# Patient Record
Sex: MALE | Race: WHITE | NOT HISPANIC OR LATINO | Employment: OTHER | URBAN - METROPOLITAN AREA
[De-identification: names, ages, dates, MRNs, and addresses within clinical notes are randomized per-mention and may not be internally consistent; named-entity substitution may affect disease eponyms.]

---

## 2024-03-04 DIAGNOSIS — M79.642 PAIN IN LEFT HAND: Primary | ICD-10-CM

## 2024-04-01 ENCOUNTER — HOSPITAL ENCOUNTER (OUTPATIENT)
Dept: RADIOLOGY | Facility: CLINIC | Age: 84
Discharge: HOME | End: 2024-04-01
Attending: STUDENT IN AN ORGANIZED HEALTH CARE EDUCATION/TRAINING PROGRAM
Payer: OTHER GOVERNMENT

## 2024-04-01 ENCOUNTER — OFFICE VISIT (OUTPATIENT)
Dept: ORTHOPEDICS | Facility: CLINIC | Age: 84
End: 2024-04-01
Payer: OTHER GOVERNMENT

## 2024-04-01 VITALS
SYSTOLIC BLOOD PRESSURE: 146 MMHG | DIASTOLIC BLOOD PRESSURE: 79 MMHG | WEIGHT: 172 LBS | HEART RATE: 66 BPM | BODY MASS INDEX: 24.08 KG/M2 | HEIGHT: 71 IN

## 2024-04-01 DIAGNOSIS — M18.12 ARTHRITIS OF CARPOMETACARPAL (CMC) JOINT OF LEFT THUMB: Primary | ICD-10-CM

## 2024-04-01 DIAGNOSIS — M79.641 BILATERAL HAND PAIN: ICD-10-CM

## 2024-04-01 DIAGNOSIS — M79.642 BILATERAL HAND PAIN: ICD-10-CM

## 2024-04-01 DIAGNOSIS — M79.642 PAIN IN LEFT HAND: ICD-10-CM

## 2024-04-01 PROCEDURE — 73130 X-RAY EXAM OF HAND: CPT | Mod: LT,,, | Performed by: STUDENT IN AN ORGANIZED HEALTH CARE EDUCATION/TRAINING PROGRAM

## 2024-04-01 PROCEDURE — 99203 OFFICE O/P NEW LOW 30 MIN: CPT | Mod: ,,, | Performed by: STUDENT IN AN ORGANIZED HEALTH CARE EDUCATION/TRAINING PROGRAM

## 2024-04-01 PROCEDURE — 73130 X-RAY EXAM OF HAND: CPT | Mod: RT,,, | Performed by: STUDENT IN AN ORGANIZED HEALTH CARE EDUCATION/TRAINING PROGRAM

## 2024-04-01 RX ORDER — AMLODIPINE BESYLATE 100 %
POWDER (GRAM) MISCELLANEOUS
COMMUNITY
End: 2024-04-24

## 2024-04-02 NOTE — PROGRESS NOTES
"Chief Complaint:  Left thumb pain    Consulting Physician: Order, Paper    History of present illness:    Patient is a 83-year-old right-hand dominant male who is retired presents for initial evaluation of his left worse than right bilateral thumb pain.  He localizes the pain to the thumb CMC joint.  This is worse when doing activities such as lifting.  He complains of a sharp pain in his area.  He has not worn a brace.  He has not had an injection.  He has not had any surgery for this.  He exacerbated this when he was doing yd work    History reviewed. No pertinent past medical history.    History reviewed. No pertinent surgical history.    Current Outpatient Medications   Medication Sig    amLODIPine besylate, bulk, 100 % Powd amLODIPine Besylate    GEMFIBROZIL, BULK, MISC      No current facility-administered medications for this visit.       Review of patient's allergies indicates:  No Known Allergies    History reviewed. No pertinent family history.    Social History     Socioeconomic History    Marital status:    Tobacco Use    Smoking status: Never    Smokeless tobacco: Never       Review of Systems:    Constitution:   Denies chills, fever, and sweats.  HENT:   Denies headaches or blurry vision.  Cardiovascular:  Denies chest pain or irregular heart beat.  Respiratory:   Denies cough or shortness of breath.  Gastrointestinal:  Denies abdominal pain, nausea, or vomiting.  Musculoskeletal:   Denies muscle cramps.  Neurological:   Denies dizziness or focal weakness.  Psychiatric/Behavior: Normal mental status.  Hematology/Lymph:  Denies bleeding problem or easy bruising/bleeding.  Skin:    Denies rash or suspicious lesions.    Examination:    Vital Signs:    Vitals:    04/01/24 0855   BP: (!) 146/79   Pulse: 66   Weight: 78 kg (172 lb)   Height: 5' 11" (1.803 m)   PainSc:   8       Body mass index is 23.99 kg/m².    Constitution:   Well-developed, well nourished patient in no acute " distress.  Neurological:   Alert and oriented x 3 and cooperative to examination.     Psychiatric/Behavior: Normal mental status.  Respiratory:   No shortness of breath.  Eyes:    Extraoccular muscles intact  Skin:    No scars, rash or suspicious lesions.    MSK:   Right hand:  No open wounds or rashes.  Tenderness to palpation of the thumb CMC joint.  Grind test is positive.  Full range of motion of the wrist hand and digits.  Sensation light touch intact in median ulnar radial distribution radial pulse 2 +hand is warm well perfused    Left hand:  No open wounds or rashes.  Tenderness to palpation of the thumb CMC joint.  Grind test is positive.  Full range of motion of the wrist hand and digits.  Sensation light touch intact in median ulnar radial distribution radial pulse 2 +hand is warm well perfused    Imaging:   X-ray of the right hand three views shows thumb CMC osteoarthritis  X-ray of the left hand three views shows thumb CMC osteoarthritis     Assessment:  Bilateral thumb CMC osteoarthritis    Plan:  We went over different treatment options including conservative treatment with bracing, injections and operative treatment such as CMC arthroplasty.  He would like to start with a brace.  He would like to start with the brace with the left side since that is the most symptomatic side.  I will give him a thumb CMC brace today and he can follow up with me as needed if his pain persists we can try a CMC injection.    Follow Up:  As needed  Xray at next visit:  None

## 2024-04-24 ENCOUNTER — OFFICE VISIT (OUTPATIENT)
Dept: ORTHOPEDICS | Facility: CLINIC | Age: 84
End: 2024-04-24
Payer: OTHER GOVERNMENT

## 2024-04-24 VITALS
DIASTOLIC BLOOD PRESSURE: 85 MMHG | BODY MASS INDEX: 24.07 KG/M2 | HEIGHT: 71 IN | TEMPERATURE: 98 F | WEIGHT: 171.94 LBS | HEART RATE: 64 BPM | SYSTOLIC BLOOD PRESSURE: 164 MMHG

## 2024-04-24 DIAGNOSIS — M18.12 ARTHRITIS OF CARPOMETACARPAL (CMC) JOINT OF LEFT THUMB: Primary | ICD-10-CM

## 2024-04-24 PROCEDURE — 99213 OFFICE O/P EST LOW 20 MIN: CPT | Mod: 25,,, | Performed by: STUDENT IN AN ORGANIZED HEALTH CARE EDUCATION/TRAINING PROGRAM

## 2024-04-24 PROCEDURE — 20600 DRAIN/INJ JOINT/BURSA W/O US: CPT | Mod: LT,,, | Performed by: STUDENT IN AN ORGANIZED HEALTH CARE EDUCATION/TRAINING PROGRAM

## 2024-04-24 RX ORDER — BETAMETHASONE SODIUM PHOSPHATE AND BETAMETHASONE ACETATE 3; 3 MG/ML; MG/ML
6 INJECTION, SUSPENSION INTRA-ARTICULAR; INTRALESIONAL; INTRAMUSCULAR; SOFT TISSUE
Status: DISCONTINUED | OUTPATIENT
Start: 2024-04-24 | End: 2024-04-24 | Stop reason: HOSPADM

## 2024-04-24 RX ORDER — LIDOCAINE HYDROCHLORIDE 10 MG/ML
1 INJECTION INFILTRATION; PERINEURAL
Status: DISCONTINUED | OUTPATIENT
Start: 2024-04-24 | End: 2024-04-24 | Stop reason: HOSPADM

## 2024-04-24 RX ORDER — AMLODIPINE BESYLATE 5 MG/1
5 TABLET ORAL
COMMUNITY
Start: 2024-02-22

## 2024-04-24 RX ORDER — GEMFIBROZIL 600 MG/1
600 TABLET, FILM COATED ORAL
COMMUNITY
Start: 2024-02-22

## 2024-04-24 RX ORDER — PANTOPRAZOLE SODIUM 20 MG/1
20 TABLET, DELAYED RELEASE ORAL DAILY
COMMUNITY
Start: 2024-02-22

## 2024-04-24 RX ADMIN — BETAMETHASONE SODIUM PHOSPHATE AND BETAMETHASONE ACETATE 6 MG: 3; 3 INJECTION, SUSPENSION INTRA-ARTICULAR; INTRALESIONAL; INTRAMUSCULAR; SOFT TISSUE at 08:04

## 2024-04-24 RX ADMIN — LIDOCAINE HYDROCHLORIDE 1 ML: 10 INJECTION INFILTRATION; PERINEURAL at 08:04

## 2024-04-24 NOTE — PROCEDURES
Small Joint Aspiration/Injection: L thumb CMC    Date/Time: 4/24/2024 8:30 AM    Performed by: Jerzy Alvarado MD  Authorized by: Jerzy Alvarado MD    Consent Done?:  Yes (Verbal)  Indications:  Pain  Timeout: prior to procedure the correct patient, procedure, and site was verified    Location:  Thumb  Site:  L thumb CMC  Needle size:  25 G  Approach:  Dorsal  Medications:  1 mL LIDOcaine HCL 10 mg/ml (1%) 10 mg/mL (1 %); 6 mg betamethasone acetate-betamethasone sodium phosphate 6 mg/mL  Patient tolerance:  Patient tolerated the procedure well with no immediate complications

## 2024-04-24 NOTE — PROGRESS NOTES
Chief Complaint:  Left thumb pain    Consulting Physician: Order, Paper    History of present illness:    Patient is a 83-year-old right-hand dominant male who is retired presents for follow up evaluation of his left worse than right bilateral thumb pain.  He localizes the pain to the thumb CMC joint.  This is worse when doing activities such as lifting.  He complains of a sharp pain in his area.  He has not worn a brace.  He has not had an injection.  He has not had any surgery for this.  He exacerbated this when he was doing yd work    I saw in my clinic last time where I diagnosed him with a CMC arthritis.  He is here for an injection today    No past medical history on file.    No past surgical history on file.    Current Outpatient Medications   Medication Sig Dispense Refill    amLODIPine (NORVASC) 5 MG tablet 5 mg.      gemfibroziL (LOPID) 600 MG tablet 600 mg.      pantoprazole (PROTONIX) 20 MG tablet Take 20 mg by mouth once daily.       No current facility-administered medications for this visit.       Review of patient's allergies indicates:  No Known Allergies    No family history on file.    Social History     Socioeconomic History    Marital status:    Tobacco Use    Smoking status: Never    Smokeless tobacco: Never       Review of Systems:    Constitution:   Denies chills, fever, and sweats.  HENT:   Denies headaches or blurry vision.  Cardiovascular:  Denies chest pain or irregular heart beat.  Respiratory:   Denies cough or shortness of breath.  Gastrointestinal:  Denies abdominal pain, nausea, or vomiting.  Musculoskeletal:   Denies muscle cramps.  Neurological:   Denies dizziness or focal weakness.  Psychiatric/Behavior: Normal mental status.  Hematology/Lymph:  Denies bleeding problem or easy bruising/bleeding.  Skin:    Denies rash or suspicious lesions.    Examination:    Vital Signs:    Vitals:    04/24/24 0845 04/24/24 0853   BP: (!) 167/87 (!) 164/85   Pulse: 60 64   Temp: 98 °F (36.7  "°C)    Weight: 78 kg (171 lb 15.3 oz)    Height: 5' 11" (1.803 m)        Body mass index is 23.98 kg/m².    Constitution:   Well-developed, well nourished patient in no acute distress.  Neurological:   Alert and oriented x 3 and cooperative to examination.     Psychiatric/Behavior: Normal mental status.  Respiratory:   No shortness of breath.  Eyes:    Extraoccular muscles intact  Skin:    No scars, rash or suspicious lesions.    MSK:   Right hand:  No open wounds or rashes.  Tenderness to palpation of the thumb CMC joint.  Grind test is positive.  Full range of motion of the wrist hand and digits.  Sensation light touch intact in median ulnar radial distribution radial pulse 2 +hand is warm well perfused    Left hand:  No open wounds or rashes.  Tenderness to palpation of the thumb CMC joint.  Grind test is positive.  Full range of motion of the wrist hand and digits.  Sensation light touch intact in median ulnar radial distribution radial pulse 2 +hand is warm well perfused    Imaging:   X-ray of the right hand three views shows thumb CMC osteoarthritis  X-ray of the left hand three views shows thumb CMC osteoarthritis     Assessment:  Bilateral thumb CMC osteoarthritis, left worse than right    Plan:  He had like an injection into the left thumb CMC joint.  I will give him an injection today.  He can continue wearing his brace.  He can follow up with me as needed    Follow Up:  As needed  Xray at next visit:  None        "

## 2024-05-01 DIAGNOSIS — N28.1 KIDNEY CYSTS: Primary | ICD-10-CM

## 2024-05-09 ENCOUNTER — OFFICE VISIT (OUTPATIENT)
Dept: UROLOGY | Facility: CLINIC | Age: 84
End: 2024-05-09
Payer: OTHER GOVERNMENT

## 2024-05-09 VITALS
OXYGEN SATURATION: 97 % | BODY MASS INDEX: 22.49 KG/M2 | HEIGHT: 71 IN | HEART RATE: 66 BPM | RESPIRATION RATE: 20 BRPM | WEIGHT: 160.63 LBS | SYSTOLIC BLOOD PRESSURE: 148 MMHG | DIASTOLIC BLOOD PRESSURE: 83 MMHG | TEMPERATURE: 98 F

## 2024-05-09 DIAGNOSIS — N28.1 RENAL CYST: ICD-10-CM

## 2024-05-09 LAB
BILIRUB SERPL-MCNC: NORMAL MG/DL
BLOOD URINE, POC: NORMAL
COLOR, POC UA: YELLOW
GLUCOSE UR QL STRIP: NORMAL
KETONES UR QL STRIP: NORMAL
LEUKOCYTE ESTERASE URINE, POC: NORMAL
NITRITE, POC UA: NORMAL
PH, POC UA: 5.5
PROTEIN, POC: NORMAL
SPECIFIC GRAVITY, POC UA: >=1.03
UROBILINOGEN, POC UA: 0.2

## 2024-05-09 PROCEDURE — 99214 OFFICE O/P EST MOD 30 MIN: CPT | Mod: PBBFAC | Performed by: UROLOGY

## 2024-05-09 PROCEDURE — 99204 OFFICE O/P NEW MOD 45 MIN: CPT | Mod: S$PBB,,, | Performed by: UROLOGY

## 2024-05-09 PROCEDURE — 81001 URINALYSIS AUTO W/SCOPE: CPT | Mod: PBBFAC | Performed by: UROLOGY

## 2024-05-09 NOTE — PROGRESS NOTES
Placed in room. Seen by  Dr. Colindres.  Spoke with patient. Obtain CT of the abdomen and pelvis. RTC in a month.

## 2024-05-09 NOTE — PROGRESS NOTES
CC:  Renal cysts    HPI:  Justin Castellano is a 84 y.o. male being seen in consultation for renal cysts.  [facility]  This is the first knowledge he has had of having these.  Denies any other urologic history or symptoms.    Urinalysis:  Results for orders placed or performed in visit on 05/09/24   POCT URINE DIPSTICK WITH MICROSCOPE, AUTOMATED   Result Value Ref Range    Color, UA Yellow     Spec Grav UA >=1.030     pH, UA 5.5     WBC, UA neg     Nitrite, UA neg     Protein, POC neg     Glucose, UA neg     Ketones, UA neg     Urobilinogen, UA 0.2     Bilirubin, POC neg     Blood, UA neg      Microscopic Urinalysis:  WBC:   None per HPF     RBC:    None per HPF     Bacteria:    None per HPF     Squamous epithelial cells:    None per HPF       Crystals:   None    Lab Results:  [facility].    Imaging:  Renal ultrasound - 7 March 2024:    1.3 cm hypoechoic lesion in the mid right kidney  4.2 cm anechoic lesion in the upper pole of the left kidney  1.0 cm anechoic lesion in the lower pole of the left kidney    Data Review:  [facility], dated [facility] 2024.  Ultrasound.  Labs.    ROS:  All systems reviewed and are negative except as documented in HPI and/or Assessment and Plan.     Patient Active Problem List:     There is no problem list on file for this patient.       Past Medical History:  History reviewed. No pertinent past medical history.     Past Surgical History:  History reviewed. No pertinent surgical history.     Family History:  History reviewed. No pertinent family history.     Social History:  Social History     Socioeconomic History    Marital status:    Tobacco Use    Smoking status: Former     Current packs/day: 1.00     Types: Cigarettes     Passive exposure: Current    Smokeless tobacco: Never        Allergies:  Review of patient's allergies indicates:  No Known Allergies     Objective:  Vitals:    05/09/24  0745   BP: (!) 148/83   Pulse: 66   Resp: 20   Temp: 98.4 °F (36.9 °C)     General:  Well developed, well nourished adult male in no acute distress  Abdomen: Soft, nontender, no masses  Extremities:  No clubbing, cyanosis, or edema  Neurologic:  Grossly intact  Musculoskeletal:  Normal tone    Assessment:  1. Renal cyst  - Ambulatory referral/consult to Urology  - POCT URINE DIPSTICK WITH MICROSCOPE, AUTOMATED  - CT Abdomen Pelvis W Wo Contrast; Future  - Creatinine, serum; Future     Plan:  The left-sided lesions or clearly simple cysts based on their description as being anechoic.  The right-sided lesion is hypoechoic so this needs further evaluation with a CT scan with and without contrast.    Follow-up:  After CT.

## 2024-05-23 ENCOUNTER — HOSPITAL ENCOUNTER (OUTPATIENT)
Dept: RADIOLOGY | Facility: HOSPITAL | Age: 84
Discharge: HOME | End: 2024-05-23
Attending: UROLOGY
Payer: OTHER GOVERNMENT

## 2024-05-23 DIAGNOSIS — N28.1 RENAL CYST: ICD-10-CM

## 2024-05-23 LAB
CREAT SERPL-MCNC: 1.07 MG/DL (ref 0.73–1.18)
GFR SERPLBLD CREATININE-BSD FMLA CKD-EPI: >60 ML/MIN/1.73/M2

## 2024-05-23 PROCEDURE — 82565 ASSAY OF CREATININE: CPT | Performed by: UROLOGY

## 2024-05-23 PROCEDURE — 25500020 PHARM REV CODE 255: Performed by: UROLOGY

## 2024-05-23 PROCEDURE — 74178 CT ABD&PLV WO CNTR FLWD CNTR: CPT | Mod: TC

## 2024-05-23 RX ADMIN — IOHEXOL 100 ML: 350 INJECTION, SOLUTION INTRAVENOUS at 12:05

## 2024-06-20 ENCOUNTER — OFFICE VISIT (OUTPATIENT)
Dept: UROLOGY | Facility: CLINIC | Age: 84
End: 2024-06-20
Payer: OTHER GOVERNMENT

## 2024-06-20 VITALS
RESPIRATION RATE: 18 BRPM | HEIGHT: 71 IN | OXYGEN SATURATION: 98 % | DIASTOLIC BLOOD PRESSURE: 85 MMHG | HEART RATE: 67 BPM | SYSTOLIC BLOOD PRESSURE: 132 MMHG | WEIGHT: 162 LBS | BODY MASS INDEX: 22.68 KG/M2

## 2024-06-20 DIAGNOSIS — N28.1 RENAL CYST: Primary | ICD-10-CM

## 2024-06-20 LAB
BILIRUB SERPL-MCNC: NEGATIVE MG/DL
BLOOD URINE, POC: NEGATIVE
COLOR, POC UA: YELLOW
GLUCOSE UR QL STRIP: NEGATIVE
KETONES UR QL STRIP: NEGATIVE
LEUKOCYTE ESTERASE URINE, POC: NEGATIVE
NITRITE, POC UA: NEGATIVE
PH, POC UA: 5.5
PROTEIN, POC: NEGATIVE
SPECIFIC GRAVITY, POC UA: 1.02
UROBILINOGEN, POC UA: 0.2

## 2024-06-20 PROCEDURE — 81001 URINALYSIS AUTO W/SCOPE: CPT | Mod: PBBFAC | Performed by: UROLOGY

## 2024-06-20 PROCEDURE — 99213 OFFICE O/P EST LOW 20 MIN: CPT | Mod: S$PBB,,, | Performed by: UROLOGY

## 2024-06-20 PROCEDURE — 99214 OFFICE O/P EST MOD 30 MIN: CPT | Mod: PBBFAC | Performed by: UROLOGY

## 2024-06-20 NOTE — PROGRESS NOTES
CC:  Imaging results    HPI:  Justin Castellano is a 84 y.o. male seen for follow-up of imaging.  He was found to bilateral renal cysts on [facility]. one of the cysts was described as possibly something other than a simple cyst.  I obtained a CT scan.  He has no urinary complaints.    Urinalysis:  Results for orders placed or performed in visit on 06/20/24   POCT URINE DIPSTICK WITH MICROSCOPE, AUTOMATED   Result Value Ref Range    Color, UA Yellow     Spec Grav UA 1.025     pH, UA 5.5     WBC, UA Negative     Nitrite, UA Negative     Protein, POC Negative     Glucose, UA Negative     Ketones, UA Negative     Urobilinogen, UA 0.2     Bilirubin, POC Negative     Blood, UA Negative      Microscopic Urinalysis:  WBC:   None per HPF     RBC:    None per HPF     Bacteria:    None per HPF     Squamous epithelial cells:    None per HPF      Crystals:   None    Lab Results:    Recent Labs     05/23/24  1127   CREATININE 1.07        Imaging:  CT - 23 May 2024:  There is a simple cyst in the upper pole of the left kidney  There has a simple cyst in the mid right kidney  I viewed the images myself to evaluate for the 3rd cyst which was not mentioned in the CT report.  This is seen in the lower pole of the left kidney appears to be a simple cyst as well.     ROS:  All systems reviewed and are negative except as documented in HPI and/or Assessment and Plan.     Patient Active Problem List:     There is no problem list on file for this patient.       Past Medical History:  History reviewed. No pertinent past medical history.     Past Surgical History:  History reviewed. No pertinent surgical history.     Family History:  History reviewed. No pertinent family history.     Social History:  Social History     Socioeconomic History    Marital status:    Tobacco Use    Smoking status: Former     Current packs/day: 1.00     Types: Cigarettes     Passive exposure: Current    Smokeless tobacco: Never         Allergies:  Review of patient's allergies indicates:  No Known Allergies     Objective:  Vitals:    06/20/24 0853   BP: 132/85   Pulse: 67   Resp: 18     General:  Well developed, well nourished adult male in no acute distress  Abdomen: Soft, nontender, no masses  Extremities:  No clubbing, cyanosis, or edema  Neurologic:  Grossly intact  Musculoskeletal:  Normal tone    Assessment:  1. Renal cyst  - POCT URINE DIPSTICK WITH MICROSCOPE, AUTOMATED  - US Retroperitoneal Limited; Future     Plan:  The cysts appear to be cysts area we will repeat the ultrasound in six months.    Follow-up:  Renal ultrasound in six months and follow-up after.

## 2024-06-20 NOTE — PROGRESS NOTES
Patient seen by Dr. Aguayo. Pt will RTC 6 months with US. Pt education given both written and verbal.

## 2024-10-14 ENCOUNTER — TELEPHONE (OUTPATIENT)
Dept: UROLOGY | Facility: CLINIC | Age: 84
End: 2024-10-14
Payer: OTHER GOVERNMENT

## 2024-10-14 NOTE — TELEPHONE ENCOUNTER
New [facility] authorization requested    ----- Message from Cristina sent at 2024  9:13 AM CDT -----  Regarding: Patient Called  Angelica,    Mr. Castellano called and stated that he believes his [facility] authorization will  on 24. His next scheduled appointment is 24. Can you please make sure that he gets a new authorization before October. He said he will be leaving the country for the month of October, and he wants to make sure that he has a new authorization before he departs, otherwise he will have to cancel his appointment... in December.     Thanks,  Cristina

## 2024-10-24 ENCOUNTER — PATIENT MESSAGE (OUTPATIENT)
Dept: RESEARCH | Facility: HOSPITAL | Age: 84
End: 2024-10-24
Payer: OTHER GOVERNMENT

## 2024-11-19 ENCOUNTER — HOSPITAL ENCOUNTER (OUTPATIENT)
Dept: RADIOLOGY | Facility: HOSPITAL | Age: 84
Discharge: HOME | End: 2024-11-19
Attending: UROLOGY
Payer: OTHER GOVERNMENT

## 2024-11-19 DIAGNOSIS — N28.1 RENAL CYST: ICD-10-CM

## 2024-11-19 PROCEDURE — 76775 US EXAM ABDO BACK WALL LIM: CPT | Mod: TC

## 2024-12-20 ENCOUNTER — OFFICE VISIT (OUTPATIENT)
Dept: UROLOGY | Facility: CLINIC | Age: 84
End: 2024-12-20
Payer: OTHER GOVERNMENT

## 2024-12-20 VITALS
WEIGHT: 161 LBS | DIASTOLIC BLOOD PRESSURE: 68 MMHG | OXYGEN SATURATION: 99 % | SYSTOLIC BLOOD PRESSURE: 136 MMHG | BODY MASS INDEX: 23.05 KG/M2 | HEIGHT: 70 IN | TEMPERATURE: 98 F | HEART RATE: 58 BPM

## 2024-12-20 DIAGNOSIS — N28.1 RENAL CYST: Primary | ICD-10-CM

## 2024-12-20 LAB
BILIRUB SERPL-MCNC: NORMAL MG/DL
BLOOD URINE, POC: NORMAL
COLOR, POC UA: YELLOW
GLUCOSE UR QL STRIP: NORMAL
KETONES UR QL STRIP: NORMAL
LEUKOCYTE ESTERASE URINE, POC: NORMAL
NITRITE, POC UA: NORMAL
PH, POC UA: 5.5
PROTEIN, POC: NORMAL
SPECIFIC GRAVITY, POC UA: 1.02
UROBILINOGEN, POC UA: 0.2

## 2024-12-20 PROCEDURE — 99214 OFFICE O/P EST MOD 30 MIN: CPT | Mod: PBBFAC | Performed by: UROLOGY

## 2024-12-20 NOTE — PROGRESS NOTES
CC:  Imaging results    HPI:  Justin Castellano is a 84 y.o. male seen for follow-up of imaging results.  He was found to bilateral renal cysts on [facility]. one of the cysts was described as possibly something other than a simple cyst. I obtained a CT scan. He has no urinary complaints.     Urinalysis:  Results for orders placed or performed in visit on 12/20/24   POCT URINE DIPSTICK WITH MICROSCOPE, AUTOMATED   Result Value Ref Range    Color, UA Yellow     Spec Grav UA 1.020     pH, UA 5.5     WBC, UA neg     Nitrite, UA neg     Protein, POC neg     Glucose, UA neg     Ketones, UA neg     Urobilinogen, UA 0.2     Bilirubin, POC neg     Blood, UA neg      Microscopic Urinalysis:  WBC:   None per HPF     RBC:    None per HPF     Bacteria:    None per HPF     Squamous epithelial cells:  None per HPF      Crystals:   None    Imaging:  Retroperitoneal ultrasound - 19 November 2024:  The cyst in the right kidney is not visible on this ultrasound imaging.  There is a cyst that measures 3.8 x 4.2 x 3.4 on the left.    The studies and a left kidney in the lower pole is not visualized on this imaging study.  Via every one of the hospitals cards Mac is lying delta this time  ROS:  All systems reviewed and are negative except as documented in HPI and/or Assessment and Plan.     Patient Active Problem List:     There is no problem list on file for this patient.       Past Medical History:  History reviewed. No pertinent past medical history.     Past Surgical History:  Past Surgical History:   Procedure Laterality Date    CHOLECYSTECTOMY      TRIGGER FINGER RELEASE Left         Family History:  History reviewed. No pertinent family history.     Social History:  Social History     Socioeconomic History    Marital status:    Tobacco Use    Smoking status: Former     Current packs/day: 1.00     Types: Cigarettes     Passive exposure: Current    Smokeless tobacco: Never   Substance and Sexual Activity     Alcohol use: Not Currently     Comment: occasional    Drug use: Not Currently        Allergies:  Review of patient's allergies indicates:  No Known Allergies     Objective:  Vitals:    12/20/24 0858   BP: 136/68   Pulse: (!) 58   Temp: 97.8 °F (36.6 °C)     General:  Well developed, well nourished adult male in no acute distress  Abdomen: Soft, nontender, no masses  Extremities:  No clubbing, cyanosis, or edema  Neurologic:  Grossly intact  Musculoskeletal:  Normal tone    Assessment:  This is gone on for quite a while    Plan:  The renal cysts are stable.  Two of the cysts are not visible.  Continue observation.    Follow-up tests needed:   Retroperitoneal ultrasound in six months.      Return appointment:  Six months with above study.

## 2024-12-20 NOTE — PROGRESS NOTES
Pt seen by Dr. Aguayo; Pt instructed to return to clinic in 6 months w/renal US; Discharge paperwork given w/pt verbalizing understanding

## 2025-07-11 ENCOUNTER — HOSPITAL ENCOUNTER (OUTPATIENT)
Dept: RADIOLOGY | Facility: HOSPITAL | Age: 85
Discharge: HOME | End: 2025-07-11
Attending: UROLOGY
Payer: MEDICARE

## 2025-07-11 DIAGNOSIS — N28.1 RENAL CYST: ICD-10-CM

## 2025-07-11 PROCEDURE — 76775 US EXAM ABDO BACK WALL LIM: CPT | Mod: TC

## 2025-07-24 ENCOUNTER — OFFICE VISIT (OUTPATIENT)
Dept: UROLOGY | Facility: CLINIC | Age: 85
End: 2025-07-24
Payer: MEDICARE

## 2025-07-24 VITALS
HEIGHT: 70 IN | SYSTOLIC BLOOD PRESSURE: 125 MMHG | WEIGHT: 159.19 LBS | DIASTOLIC BLOOD PRESSURE: 67 MMHG | OXYGEN SATURATION: 97 % | TEMPERATURE: 99 F | RESPIRATION RATE: 20 BRPM | HEART RATE: 59 BPM | BODY MASS INDEX: 22.79 KG/M2

## 2025-07-24 DIAGNOSIS — N28.1 RENAL CYST: Primary | ICD-10-CM

## 2025-07-24 PROCEDURE — 81001 URINALYSIS AUTO W/SCOPE: CPT | Mod: PBBFAC | Performed by: UROLOGY

## 2025-07-24 PROCEDURE — 99215 OFFICE O/P EST HI 40 MIN: CPT | Mod: PBBFAC | Performed by: UROLOGY

## 2025-07-24 PROCEDURE — 99213 OFFICE O/P EST LOW 20 MIN: CPT | Mod: S$PBB,,, | Performed by: UROLOGY

## 2025-07-24 RX ORDER — CLOPIDOGREL BISULFATE 75 MG/1
75 TABLET ORAL DAILY
COMMUNITY
Start: 2025-05-10

## 2025-07-24 RX ORDER — ASPIRIN 81 MG/1
81 TABLET ORAL DAILY
COMMUNITY
Start: 2025-05-10

## 2025-07-24 NOTE — PROGRESS NOTES
CC:  Ultrasound results    HPI:  Justin Castellano is a 85 y.o. male seen for follow-up of ultrasound.  He was found to bilateral renal cysts [facility]. One of the cysts was described as possibly something other than a simple cyst. I obtained a CT scan which showed a 0.7 x 0.7 cm cyst in the mid right kidney and a 3.8 x 4.2 x 3.4 cm cyst in the upper pole of the left kidney.  He is asymptomatic.    Urinalysis:  Results for orders placed or performed in visit on 07/24/25   POCT URINE DIPSTICK WITH MICROSCOPE, AUTOMATED   Result Value Ref Range    Color, UA Yellow     Spec Grav UA 1.020     pH, UA 5.5     WBC, UA Negative     Nitrite, UA Negative     Protein, POC Negative     Glucose, UA Negative     Ketones, UA Negative     Urobilinogen, UA 0.2     Bilirubin, POC Negative     Blood, UA Negative      Microscopic Urinalysis:  WBC:   None per HPF     RBC:    None per HPF     Bacteria:    None per HPF     Squamous epithelial cells:  None per HPF      Crystals:   None    Imaging:  Retroperitoneal ultrasound - 11 July 2025:  Right kidney is normal.  Left kidney has a cyst in the upper pole measuring 4.4 x 4.0 x 4.7 cm.    ROS:  All systems reviewed and are negative except as documented in HPI and/or Assessment and Plan.     Patient Active Problem List:     Problem List[1]     Past Medical History:  Past Medical History:   Diagnosis Date    Essential (primary) hypertension         Past Surgical History:  Past Surgical History:   Procedure Laterality Date    CARDIAC VALVE REPLACEMENT  05/2025    CHOLECYSTECTOMY      TRIGGER FINGER RELEASE Left         Family History:  History reviewed. No pertinent family history.     Social History:  Social History     Socioeconomic History    Marital status:    Tobacco Use    Smoking status: Former     Current packs/day: 1.00     Types: Cigarettes     Passive exposure: Current    Smokeless tobacco: Never   Substance and Sexual Activity    Alcohol use: Yes      Comment: occasionally    Drug use: Not Currently     Social Drivers of Health     Financial Resource Strain: Patient Declined (5/8/2025)    [facility]    Overall Financial Resource Strain (CARDIA)     Difficulty of Paying Living Expenses: Patient declined   Food Insecurity: Patient Declined (5/8/2025)    Received from [facility]    Hunger Vital Sign     Worried About Running Out of Food in the Last Year: Patient declined     Ran Out of Food in the Last Year: Patient declined   Transportation Needs: Patient Declined (5/8/2025)    Received from [facility]    PRAPARE - Transportation     Lack of Transportation (Medical): Patient declined     Lack of Transportation (Non-Medical): Patient declined   Housing Stability: Patient Declined (5/8/2025)    [facility]    Housing Stability Vital Sign     Unable to Pay for Housing in the Last Year: Patient declined     Number of Times Moved in the Last Year: 0     Homeless in the Last Year: Patient declined        Allergies:  Review of patient's allergies indicates:  No Known Allergies     Objective:  Vitals:    07/24/25 1052   BP: 125/67   Pulse: (!) 59   Resp: 20   Temp: 98.7 °F (37.1 °C)     General:  Well developed, well nourished adult male in no acute distress  Abdomen: Soft, nontender, no masses  Extremities:  No clubbing, cyanosis, or edema  Neurologic:  Grossly intact  Musculoskeletal:  Normal tone    Assessment:  1. Renal cyst  - POCT URINE DIPSTICK WITH MICROSCOPE, AUTOMATED  - US Retroperitoneal Limited; Future    Plan:  The most recent ultrasound and the previous ultrasound both showed only the left renal cyst which has remained stable.  In  reviewing the CT imaging today with the patient I noted that the right cyst appears to abut the calyx and therefore on ultrasound may look like it is part of the collecting system and this may be why it is not being seen.  Repeat the retroperitoneal ultrasound in one year.    Follow-up tests needed:   Ultrasound in one year.      Return appointment:  One year with above study.                   [1] There is no problem list on file for this patient.

## 2025-07-24 NOTE — PROGRESS NOTES
Pt in Urology clinic today for f/u visit.  [facility] urine obtained for dipstick UA.  Evaluated per Dr. Aguayo.  Will RTC in 1 year with U/S.